# Patient Record
Sex: FEMALE | Race: WHITE | NOT HISPANIC OR LATINO | URBAN - METROPOLITAN AREA
[De-identification: names, ages, dates, MRNs, and addresses within clinical notes are randomized per-mention and may not be internally consistent; named-entity substitution may affect disease eponyms.]

---

## 2024-04-25 NOTE — H&P (VIEW-ONLY)
MEDICAL CARE AT HOME - FOLLOW UP VISIT    SERVICE DATE: 4/25/2024    ASSESSMENT  IDENTIFICATION AND INTRODUCTION  Norah Patrick is a 68 year old year old female.  PLACE OF SERVICE FOR THIS VISIT: Private Home (POS 12)  Visit at patient's home/place of residence is medically necessary in lieu of an office because the patient is homebound, the patient needs the help of another person to leave the home, the patient needs a walker or wheelchair to leave the home, and the patient needs special transport to leave the home.  The patient is being seen with spouse.        PLAN  ASSESSMENT/PLAN:  1. Multiple sclerosis (HCC) - ICD9: 340, ICD10: G35 (primary diagnosis)  -  HYDROCODONE 7.5 MG-ACETAMINOPHEN 325 MG TABLET    2. 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 Spouse is managing care.  Has appointment next week for botox injection to bladder (has some leaking around s/p catheter)  Using baclofen for muscles spasms  -  HYDROCODONE 7.5 MG-ACETAMINOPHEN 325 MG TABLET    3. Chronic insomnia - ICD9: 780.52, ICD10: F51.04  She asks about ambien (saw on v)-I advised against this for her.   Would recommend continue with current plan  Discussed possible sleep apnea and doing sleep study-she does not want  Also discussed overnight oximetry-They will let me know if they want this.     Fracture Left leg- Pain is improving-using voltaren.     Norah CELESTE Pat    Does the patient need refills for this visit? yes, refilled meds    Visit billing based on time: No, visit will not be billed on time    Enrolled in Hospice: No  No Hospice terminal diagnosis.  No benefit period information found        FOLLOW UP ([place] [address])  F/U preferably Narcotic refill patient, follow up in 28 days., at least within 12 wks; provider: Nurse Practitioner  Schedule next visit as: Face to Face In Person or Virtual   DME/Supplies Needed: No    SUBJECTIVE    Chief Complaint: Patient presents with:  Follow Up  Insomnia        HISTORY OF PRESENT ILLNESS  By problem, patient and/or caregiver's concerns today are:    Insomnia- having trouble sleeping at night.   She occasionally nods off during the day per her spouse but she denies sleeping during the day. Also, spouse reports that she is snoring at night when he checks on her.   Using trazodone and melatonin    MS- She is bed bound. Has a apoorva but transfers are on hold due to fracture of her leg.   She continues to use norco for her chronic pain      Leg fracture-Pain in her leg is much improved-using voltaren      History obtained from medical record review as well as PATIENT and SPOUSE.      PAST MEDICAL HISTORY  The patient's past medical and surgical history and active problem list were reviewed and updated as appropriate for this encounter.    FAMILY HISTORY   FAMILY HISTORY    Problem Relation Age of Onset   • No Family History No Family History         colon cancer/polyps       MEDICATION REVIEW  The patient's active medications were reviewed and updated as appropriate for this encounter.   Medication Reconciliation done in the home:Yes     SOCIAL HISTORY  Social History    Social History Narrative      Not on file    Living Situation: single home, with spouse    GERIATRIC REVIEW OF SYSTEMS  Falls since last Provider visit?  No    Oxygen  Oxygen liter flow: n/a, no oxygen in use    REVIEW OF SYSTEMS    Review of Systems   Constitutional:  Negative for appetite change, chills, fatigue and fever.   Respiratory:  Negative for cough, shortness of breath and wheezing.    Cardiovascular:  Negative for chest pain, palpitations and leg swelling.   Gastrointestinal:  Positive for nausea (intermittment). Negative for abdominal distention, abdominal pain, constipation, diarrhea and vomiting.        Colostomy   Genitourinary:         S/P catheter   Musculoskeletal:  Positive for arthralgias and myalgias. Negative for gait problem (bed bound).   Skin:  Positive for wound (pressure ulcer).    Neurological:  Positive for weakness.   Psychiatric/Behavioral:  Positive for sleep disturbance.        Pain on 0-10 scale: 6 - severe pain  If pain is 4 or more or if pain is not at an acceptable level, interventions to treat pain: norco     OBJECTIVE  PHYSICAL EXAM  /64   Pulse 78   Temp 36.8 °C (98.2 °F)   Resp 16   SpO2 96%       Physical Exam  Constitutional:       General: She is not in acute distress.  HENT:      Head: Normocephalic and atraumatic.   Cardiovascular:      Rate and Rhythm: Normal rate and regular rhythm.      Heart sounds: Normal heart sounds. No murmur heard.     No gallop.   Pulmonary:      Effort: Pulmonary effort is normal.      Breath sounds: Normal breath sounds. No wheezing or rhonchi.   Abdominal:      General: Bowel sounds are normal. There is no distension.      Palpations: Abdomen is soft.      Tenderness: There is no abdominal tenderness. There is no guarding.   Musculoskeletal:      Right lower leg: No edema.      Left lower leg: No edema.   Skin:     General: Skin is warm and dry.   Neurological:      Mental Status: She is alert and oriented to person, place, and time. Mental status is at baseline.   Psychiatric:         Mood and Affect: Mood normal.         Behavior: Behavior normal.         ANCILLARY DATA REVIEWED  No new labs    Counseling and Coordination of Care  Counseled on disease prognosis and plan of care and Counseled on medications and side effects        SIGNATURE: Norah Stewart APRN.CNP PATIENT NAME: Norah Patrick   DATE: April 25, 2024 MRN: [id number]   PAGER/CONTACT #: [phone]

## 2024-05-01 ENCOUNTER — ANESTHESIA EVENT (OUTPATIENT)
Dept: OPERATING ROOM | Facility: HOSPITAL | Age: 69
End: 2024-05-01
Payer: MEDICARE

## 2024-05-01 ENCOUNTER — HOSPITAL ENCOUNTER (OUTPATIENT)
Facility: HOSPITAL | Age: 69
Setting detail: OUTPATIENT SURGERY
Discharge: HOME | End: 2024-05-01
Attending: UROLOGY | Admitting: UROLOGY
Payer: MEDICARE

## 2024-05-01 ENCOUNTER — ANESTHESIA (OUTPATIENT)
Dept: OPERATING ROOM | Facility: HOSPITAL | Age: 69
End: 2024-05-01
Payer: MEDICARE

## 2024-05-01 VITALS
RESPIRATION RATE: 16 BRPM | TEMPERATURE: 96.8 F | HEIGHT: 63 IN | SYSTOLIC BLOOD PRESSURE: 159 MMHG | BODY MASS INDEX: 17.72 KG/M2 | WEIGHT: 100 LBS | DIASTOLIC BLOOD PRESSURE: 73 MMHG | HEART RATE: 89 BPM | OXYGEN SATURATION: 100 %

## 2024-05-01 DIAGNOSIS — N31.9 NEUROGENIC BLADDER: Primary | ICD-10-CM

## 2024-05-01 PROBLEM — E78.5 HYPERLIPIDEMIA: Status: ACTIVE | Noted: 2024-05-01

## 2024-05-01 PROBLEM — F41.9 ANXIETY: Status: ACTIVE | Noted: 2024-05-01

## 2024-05-01 PROBLEM — K21.9 GASTROESOPHAGEAL REFLUX DISEASE: Status: ACTIVE | Noted: 2024-05-01

## 2024-05-01 PROBLEM — F32.A DEPRESSION: Status: ACTIVE | Noted: 2024-05-01

## 2024-05-01 PROBLEM — D64.9 ANEMIA: Status: ACTIVE | Noted: 2024-05-01

## 2024-05-01 PROBLEM — G35 MULTIPLE SCLEROSIS (MULTI): Status: ACTIVE | Noted: 2024-05-01

## 2024-05-01 PROBLEM — D69.6 THROMBOCYTOPENIA (CMS-HCC): Status: ACTIVE | Noted: 2024-05-01

## 2024-05-01 PROCEDURE — 96372 THER/PROPH/DIAG INJ SC/IM: CPT | Performed by: UROLOGY

## 2024-05-01 PROCEDURE — 3600000003 HC OR TIME - INITIAL BASE CHARGE - PROCEDURE LEVEL THREE: Performed by: UROLOGY

## 2024-05-01 PROCEDURE — 2500000005 HC RX 250 GENERAL PHARMACY W/O HCPCS: Performed by: ANESTHESIOLOGY

## 2024-05-01 PROCEDURE — 2500000001 HC RX 250 WO HCPCS SELF ADMINISTERED DRUGS (ALT 637 FOR MEDICARE OP): Performed by: ANESTHESIOLOGY

## 2024-05-01 PROCEDURE — 2500000004 HC RX 250 GENERAL PHARMACY W/ HCPCS (ALT 636 FOR OP/ED): Mod: JZ | Performed by: UROLOGY

## 2024-05-01 PROCEDURE — 2500000004 HC RX 250 GENERAL PHARMACY W/ HCPCS (ALT 636 FOR OP/ED): Performed by: ANESTHESIOLOGY

## 2024-05-01 PROCEDURE — 7100000001 HC RECOVERY ROOM TIME - INITIAL BASE CHARGE: Performed by: UROLOGY

## 2024-05-01 PROCEDURE — 3600000008 HC OR TIME - EACH INCREMENTAL 1 MINUTE - PROCEDURE LEVEL THREE: Performed by: UROLOGY

## 2024-05-01 PROCEDURE — 7100000010 HC PHASE TWO TIME - EACH INCREMENTAL 1 MINUTE: Performed by: UROLOGY

## 2024-05-01 PROCEDURE — 7100000009 HC PHASE TWO TIME - INITIAL BASE CHARGE: Performed by: UROLOGY

## 2024-05-01 PROCEDURE — 2720000007 HC OR 272 NO HCPCS: Performed by: UROLOGY

## 2024-05-01 PROCEDURE — 3700000002 HC GENERAL ANESTHESIA TIME - EACH INCREMENTAL 1 MINUTE: Performed by: UROLOGY

## 2024-05-01 PROCEDURE — 3700000001 HC GENERAL ANESTHESIA TIME - INITIAL BASE CHARGE: Performed by: UROLOGY

## 2024-05-01 PROCEDURE — 7100000002 HC RECOVERY ROOM TIME - EACH INCREMENTAL 1 MINUTE: Performed by: UROLOGY

## 2024-05-01 RX ORDER — KETOCONAZOLE 20 MG/G
1 CREAM TOPICAL DAILY
COMMUNITY

## 2024-05-01 RX ORDER — HYDROCODONE BITARTRATE AND ACETAMINOPHEN 7.5; 325 MG/1; MG/1
1 TABLET ORAL EVERY 6 HOURS PRN
Status: DISCONTINUED | OUTPATIENT
Start: 2024-05-01 | End: 2024-05-01 | Stop reason: HOSPADM

## 2024-05-01 RX ORDER — HYDROCODONE BITARTRATE AND ACETAMINOPHEN 7.5; 325 MG/1; MG/1
1 TABLET ORAL 3 TIMES DAILY PRN
COMMUNITY

## 2024-05-01 RX ORDER — ONDANSETRON 4 MG/1
4 TABLET, FILM COATED ORAL EVERY 8 HOURS PRN
COMMUNITY

## 2024-05-01 RX ORDER — OXYBUTYNIN CHLORIDE 15 MG/1
15 TABLET, EXTENDED RELEASE ORAL DAILY
COMMUNITY

## 2024-05-01 RX ORDER — PROPOFOL 10 MG/ML
INJECTION, EMULSION INTRAVENOUS AS NEEDED
Status: DISCONTINUED | OUTPATIENT
Start: 2024-05-01 | End: 2024-05-01

## 2024-05-01 RX ORDER — GABAPENTIN 100 MG/1
100 CAPSULE ORAL 2 TIMES DAILY
COMMUNITY

## 2024-05-01 RX ORDER — VANCOMYCIN HYDROCHLORIDE 125 MG/1
125 CAPSULE ORAL WEEKLY
COMMUNITY

## 2024-05-01 RX ORDER — ONDANSETRON HYDROCHLORIDE 2 MG/ML
4 INJECTION, SOLUTION INTRAVENOUS ONCE AS NEEDED
Status: COMPLETED | OUTPATIENT
Start: 2024-05-01 | End: 2024-05-01

## 2024-05-01 RX ORDER — LIDOCAINE HYDROCHLORIDE 10 MG/ML
0.1 INJECTION INFILTRATION; PERINEURAL ONCE
Status: DISCONTINUED | OUTPATIENT
Start: 2024-05-01 | End: 2024-05-01 | Stop reason: HOSPADM

## 2024-05-01 RX ORDER — LACTULOSE 10 G/10G
10 SOLUTION ORAL DAILY
COMMUNITY

## 2024-05-01 RX ORDER — BENZONATATE 100 MG/1
100 CAPSULE ORAL 3 TIMES DAILY PRN
COMMUNITY

## 2024-05-01 RX ORDER — BUTYROSPERMUM PARKII(SHEA BUTTER), SIMMONDSIA CHINENSIS (JOJOBA) SEED OIL, ALOE BARBADENSIS LEAF EXTRACT .01; 1; 3.5 G/100G; G/100G; G/100G
250 LIQUID TOPICAL DAILY
COMMUNITY

## 2024-05-01 RX ORDER — CEFAZOLIN SODIUM 2 G/100ML
2 INJECTION, SOLUTION INTRAVENOUS ONCE
Status: COMPLETED | OUTPATIENT
Start: 2024-05-01 | End: 2024-05-01

## 2024-05-01 RX ORDER — MIRTAZAPINE 15 MG/1
15 TABLET, FILM COATED ORAL NIGHTLY
COMMUNITY

## 2024-05-01 RX ORDER — LIDOCAINE HYDROCHLORIDE 10 MG/ML
INJECTION INFILTRATION; PERINEURAL AS NEEDED
Status: DISCONTINUED | OUTPATIENT
Start: 2024-05-01 | End: 2024-05-01

## 2024-05-01 RX ORDER — SODIUM CHLORIDE, SODIUM LACTATE, POTASSIUM CHLORIDE, CALCIUM CHLORIDE 600; 310; 30; 20 MG/100ML; MG/100ML; MG/100ML; MG/100ML
100 INJECTION, SOLUTION INTRAVENOUS CONTINUOUS
Status: DISCONTINUED | OUTPATIENT
Start: 2024-05-01 | End: 2024-05-01 | Stop reason: HOSPADM

## 2024-05-01 RX ORDER — CLOTRIMAZOLE 1 %
1 CREAM (GRAM) TOPICAL 2 TIMES DAILY
COMMUNITY

## 2024-05-01 RX ORDER — ACETAMINOPHEN 500 MG
500 TABLET ORAL DAILY
COMMUNITY

## 2024-05-01 RX ORDER — SODIUM CHLORIDE, SODIUM LACTATE, POTASSIUM CHLORIDE, CALCIUM CHLORIDE 600; 310; 30; 20 MG/100ML; MG/100ML; MG/100ML; MG/100ML
INJECTION, SOLUTION INTRAVENOUS CONTINUOUS PRN
Status: DISCONTINUED | OUTPATIENT
Start: 2024-05-01 | End: 2024-05-01

## 2024-05-01 RX ORDER — OMEPRAZOLE 20 MG/1
20 TABLET, DELAYED RELEASE ORAL
COMMUNITY

## 2024-05-01 RX ORDER — HYDRALAZINE HYDROCHLORIDE 20 MG/ML
5 INJECTION INTRAMUSCULAR; INTRAVENOUS EVERY 30 MIN PRN
Status: DISCONTINUED | OUTPATIENT
Start: 2024-05-01 | End: 2024-05-01 | Stop reason: HOSPADM

## 2024-05-01 RX ORDER — ACETAMINOPHEN, DIPHENHYDRAMINE HCL, PHENYLEPHRINE HCL 325; 25; 5 MG/1; MG/1; MG/1
10 TABLET ORAL NIGHTLY
COMMUNITY

## 2024-05-01 RX ORDER — ALBUTEROL SULFATE 0.83 MG/ML
2.5 SOLUTION RESPIRATORY (INHALATION) ONCE AS NEEDED
Status: DISCONTINUED | OUTPATIENT
Start: 2024-05-01 | End: 2024-05-01 | Stop reason: HOSPADM

## 2024-05-01 RX ORDER — CALCIUM CARBONATE/VITAMIN D3 250-3.125
1 TABLET ORAL DAILY
COMMUNITY

## 2024-05-01 RX ORDER — BACLOFEN 20 MG/1
TABLET ORAL 3 TIMES DAILY
COMMUNITY

## 2024-05-01 RX ORDER — POTASSIUM CHLORIDE 750 MG/1
10 TABLET, FILM COATED, EXTENDED RELEASE ORAL DAILY
COMMUNITY

## 2024-05-01 RX ORDER — TRAZODONE HYDROCHLORIDE 150 MG/1
150 TABLET ORAL NIGHTLY
COMMUNITY

## 2024-05-01 RX ADMIN — SODIUM CHLORIDE, POTASSIUM CHLORIDE, SODIUM LACTATE AND CALCIUM CHLORIDE: 600; 310; 30; 20 INJECTION, SOLUTION INTRAVENOUS at 12:50

## 2024-05-01 RX ADMIN — PROPOFOL 80 MG: 10 INJECTION, EMULSION INTRAVENOUS at 13:22

## 2024-05-01 RX ADMIN — LIDOCAINE HYDROCHLORIDE 3 ML: 10 INJECTION, SOLUTION INFILTRATION; PERINEURAL at 13:28

## 2024-05-01 RX ADMIN — HYDROCODONE BITARTRATE AND ACETAMINOPHEN 1 TABLET: 7.5; 325 TABLET ORAL at 14:36

## 2024-05-01 RX ADMIN — PROPOFOL 20 MG: 10 INJECTION, EMULSION INTRAVENOUS at 13:28

## 2024-05-01 RX ADMIN — PROPOFOL 20 MG: 10 INJECTION, EMULSION INTRAVENOUS at 13:36

## 2024-05-01 RX ADMIN — ONDANSETRON 4 MG: 2 INJECTION INTRAMUSCULAR; INTRAVENOUS at 14:14

## 2024-05-01 RX ADMIN — PROPOFOL 10 MG: 10 INJECTION, EMULSION INTRAVENOUS at 13:41

## 2024-05-01 RX ADMIN — CEFAZOLIN SODIUM 2 G: 2 INJECTION, SOLUTION INTRAVENOUS at 13:28

## 2024-05-01 SDOH — HEALTH STABILITY: MENTAL HEALTH: CURRENT SMOKER: 0

## 2024-05-01 ASSESSMENT — PAIN - FUNCTIONAL ASSESSMENT
PAIN_FUNCTIONAL_ASSESSMENT: 0-10

## 2024-05-01 ASSESSMENT — COLUMBIA-SUICIDE SEVERITY RATING SCALE - C-SSRS
6. HAVE YOU EVER DONE ANYTHING, STARTED TO DO ANYTHING, OR PREPARED TO DO ANYTHING TO END YOUR LIFE?: NO
2. HAVE YOU ACTUALLY HAD ANY THOUGHTS OF KILLING YOURSELF?: NO
1. IN THE PAST MONTH, HAVE YOU WISHED YOU WERE DEAD OR WISHED YOU COULD GO TO SLEEP AND NOT WAKE UP?: NO

## 2024-05-01 ASSESSMENT — PAIN DESCRIPTION - DESCRIPTORS
DESCRIPTORS: ACHING
DESCRIPTORS: ACHING;DISCOMFORT

## 2024-05-01 ASSESSMENT — PAIN SCALES - GENERAL
PAINLEVEL_OUTOF10: 7
PAINLEVEL_OUTOF10: 8
PAINLEVEL_OUTOF10: 3
PAINLEVEL_OUTOF10: 8
PAINLEVEL_OUTOF10: 3

## 2024-05-01 ASSESSMENT — PAIN DESCRIPTION - LOCATION: LOCATION: LEG

## 2024-05-01 ASSESSMENT — PAIN DESCRIPTION - ORIENTATION: ORIENTATION: LEFT

## 2024-05-01 NOTE — ANESTHESIA PROCEDURE NOTES
Airway  Date/Time: 5/1/2024 1:26 PM  Airway not difficult    Staffing  Performed: attending   Authorized by: Shamar Cortez MD    Performed by: Shamar Cortez MD  Patient location during procedure: OR    Indications and Patient Condition  Indications for airway management: anesthesia  Spontaneous ventilation: present  Sedation level: moderate (conscious sedation)  Preoxygenated: yes  Patient position: sniffing  MILS maintained throughout  Mask difficulty assessment: 1 - vent by mask    Final Airway Details  Final airway type: mask

## 2024-05-01 NOTE — ANESTHESIA POSTPROCEDURE EVALUATION
Patient: Norah Patrick    Procedure Summary       Date: 05/01/24 Room / Location: [facility] 06 / [facility]    Anesthesia Start: 1319 Anesthesia Stop: 1359    Procedure: Cystoscopy with Botulinum Toxin Injection 400 Units Diagnosis:       Spastic neurogenic bladder      (NEUROGENIC BLADDER N31.8)    Surgeons: Parisa Bashir MD Responsible Provider: Shamar Cortez MD    Anesthesia Type: general ASA Status: 4            Anesthesia Type: general    Vitals Value Taken Time   /65 05/01/24 1359   Temp 36.0 05/01/24 1359   Pulse 93 05/01/24 1359   Resp 14 05/01/24 1359   SpO2 96 05/01/24 1359       Anesthesia Post Evaluation    Patient location during evaluation: PACU  Patient participation: complete - patient participated  Level of consciousness: awake and alert  Pain management: adequate  Airway patency: patent  Cardiovascular status: acceptable  Respiratory status: acceptable  Hydration status: acceptable  Postoperative Nausea and Vomiting: none        There were no known notable events for this encounter.

## 2024-05-01 NOTE — OP NOTE
Cystoscopy with Botulinum Toxin Injection 400 Units Operative Note     Date: 2024  OR Location: [facility]    Name: Norah Patrick, : 1955, Age: 68 y.o., MRN: [id number], Sex: female    Diagnosis  Pre-op Diagnosis     * Spastic neurogenic bladder [N31.8] Post-op Diagnosis     * Spastic neurogenic bladder [N31.8]     Procedures  Cystoscopy with Botulinum Toxin Injection 400 Units  50030 - WA CYSTOURETHROSCOPY INJ CHEMODENERVATION BLADDER      Surgeons      * Parisa Bashir - Primary    Resident/Fellow/Other Assistant:  Surgeons and Role:  * No surgeons found with a matching role *    Procedure Summary  Anesthesia: Monitor Anesthesia Care  ASA: IV  Anesthesia Staff: Anesthesiologist: Shamar Cortez MD  Estimated Blood Loss: 15mL  Intra-op Medications:   Administrations occurring from 1245 to 1400 on 24:   Medication Name Total Dose   onabotulinumtoxinA (Botox) injection 400 Units   ceFAZolin in dextrose (iso-os) (Ancef) IVPB 2 g 2 g              Anesthesia Record               Intraprocedure I/O Totals       None           Specimen: No specimens collected     Staff:   Circulator: Giancarlo Carbajal RN  Scrub Person: Yoel Ordaz RN; Leo Back RN         Drains and/or Catheters: * None in log *    Tourniquet Times:         Implants:     Findings: small contracted bladder    Indications: Norah Patrick is an 68 y.o. female who is having surgery for NEUROGENIC BLADDER N31.8.      The patient was seen in the preoperative area. The risks, benefits, complications, treatment options, non-operative alternatives, expected recovery and outcomes were discussed with the patient. The possibilities of reaction to medication, pulmonary aspiration, injury to surrounding structures, bleeding, recurrent infection, the need for additional procedures, failure to diagnose a condition, and creating a complication requiring transfusion or operation were discussed with the patient. The patient concurred with the  proposed plan, giving informed consent.  The site of surgery was properly noted/marked if necessary per policy. The patient has been actively warmed in preoperative area. Preoperative antibiotics have been ordered and given within 1 hours of incision. Venous thrombosis prophylaxis are not indicated.    Procedure Details: After informed consent, pt was induced under anesthesia ad prepped and draped in elevated leg position, the sp tube was cross clamped and cystoscopic exam was carried out w a 22 f cystoscope.  400 units were injected w transurethral needle into the submucosa .  This was well tolerated.  Her indwelling sp tube was deflated and a new 26 f sp tube placed in standard fashion.  It easily irrigated for pink urine  Complications:  None; patient tolerated the procedure well.    Disposition: PACU - hemodynamically stable.  Condition: stable         Additional Details:      Attending Attestation:     Parisa Bashir  Phone Number: [phone]

## 2024-05-01 NOTE — POST-PROCEDURE NOTE
Patient pulled from PACU, report from MELANIE Kaye. Patient does not have any surgical pain, just complains of some pain in her legs which is pre existing. Patients head to toe assessment remains unchanged from previous assessment. Gotti catheter draining blood tinged urine, suprapubic area clean and dry. Patient tolerating PO well.  at bedside, he has already set up [facility] transportation for 3:30pm. Will monitor.    1511- Discharge instructions reviewed with patient and her , both verbalize understanding. Dressing around suprapubic site dry and intact. Patient states she wants to go home in the gown instead of getting dressed since [facility] is transporting her on a stretcher.    1553- Patient resting comfortably, waiting on [facility] ambulance.    1602- [facility] here. Gotti emptied for 200mls of red tinged urine.

## 2024-05-01 NOTE — ANESTHESIA PREPROCEDURE EVALUATION
Patient: Norah Patrick    Procedure Information       Date/Time: 05/01/24 1245    Procedure: Cystoscopy with Botulinum Toxin Injection 400 Units - *AOA - pt comes in on gurney* Pharmacy aware    Location: [facility] / [facility]    Surgeons: Parisa Bashir MD            Relevant Problems   Cardiac   (+) Hyperlipidemia      Neuro   (+) Anxiety   (+) Depression   (+) Multiple sclerosis (Multi)      GI   (+) Gastroesophageal reflux disease      Hematology   (+) Thrombocytopenia (CMS-HCC)     Past Medical History:   Diagnosis Date    Chronic pain disorder     Fractures     GERD (gastroesophageal reflux disease)     History of blood transfusion     MS (multiple sclerosis) (Multi)     Thrombocytopenia (CMS-HCC)     Urinary tract infection     Vertigo       Past Surgical History:   Procedure Laterality Date    COLON SURGERY      COLONOSCOPY      FEMUR FRACTURE SURGERY  06/23/2014    Femur Repair    HYSTERECTOMY  06/23/2014    Hysterectomy    MR HEAD ANGIO WO IV CONTRAST  06/25/2019    MR HEAD ANGIO WO IV CONTRAST [facility] INPATIENT LEGACY    TONSILLECTOMY  06/23/2014    Tonsillectomy    US GUIDED ABDOMINAL PARACENTESIS  03/09/2017    US GUIDED ABDOMINAL PARACENTESIS [facility] INPATIENT LEGACY      Clinical information reviewed:   Tobacco  Allergies  Meds   Med Hx  Surg Hx   Fam Hx  Soc Hx        NPO Detail:  NPO/Void Status  Carbohydrate Drink Given Prior to Surgery? : N  Date of Last Liquid: 05/01/24  Time of Last Liquid: 0730  Date of Last Solid: 04/30/24  Time of Last Solid: 1600  Last Intake Type: Clear fluids  Time of Last Void: 1015         Physical Exam    Airway  Mallampati: II  TM distance: <3 FB  Neck ROM: limited     Cardiovascular    Dental    Pulmonary    Abdominal            Anesthesia Plan    History of general anesthesia?: yes  History of complications of general anesthesia?: no    ASA 4     general     The patient is not a current smoker.  Patient was not previously instructed to abstain from smoking on  day of procedure.  Patient did not smoke on day of procedure.    intravenous induction   Postoperative administration of opioids is intended.  Anesthetic plan and risks discussed with patient.    Plan discussed with attending.

## 2024-05-01 NOTE — DISCHARGE SUMMARY
Discharge Diagnosis  Neurogenic bladder    Issues Requiring Follow-Up   none    Test Results Pending At Discharge  Pending Labs       No current pending labs.            Hospital Course   Tolerated well    Pertinent Physical Exam At Time of Discharge  Physical Exam    Home Medications     Medication List      CONTINUE taking these medications     acetaminophen 500 mg tablet; Commonly known as: Tylenol   baclofen 20 mg tablet; Commonly known as: Lioresal   benzonatate 100 mg capsule; Commonly known as: Tessalon   calcium carbonate-vitamin D3 250 mg-3.125 mcg (125 unit) tablet;   Commonly known as: Oyster Shell   clotrimazole 1 % cream; Commonly known as: Lotrimin   gabapentin 100 mg capsule; Commonly known as: Neurontin   HYDROcodone-acetaminophen 7.5-325 mg tablet; Commonly known as: Norco   ketoconazole 2 % cream; Commonly known as: NIZOral   lactulose 10 gram packet; Commonly known as: Kristalose   melatonin 10 mg tablet   mirtazapine 15 mg tablet; Commonly known as: Remeron   omeprazole OTC 20 mg EC tablet; Commonly known as: PriLOSEC OTC   ondansetron 4 mg tablet; Commonly known as: Zofran   oxybutynin XL 15 mg 24 hr tablet; Commonly known as: Ditropan-XL   potassium chloride CR 10 mEq ER tablet; Commonly known as: Klor-Con   saccharomyces boulardii 250 mg capsule; Commonly known as: Florastor   traZODone 150 mg tablet; Commonly known as: Desyrel   vancomycin 125 mg capsule; Commonly known as: Vancocin       Outpatient Follow-Up  No future appointments.    Parisa Bashir MD

## (undated) DEVICE — COLLECTION BAG, FLUID, NON-STERILE

## (undated) DEVICE — Device

## (undated) DEVICE — GLOVES, SURG BIOGEL, SZ-7.0, PF, LF

## (undated) DEVICE — SLEEVE, VASO PRESS, CALF GARMENT, MEDIUM, GREEN

## (undated) DEVICE — DRESSING, NON-ADHERENT, TELFA, OUCHLESS, 3 X 8 IN, STERILE